# Patient Record
Sex: FEMALE | Race: WHITE | NOT HISPANIC OR LATINO | URBAN - METROPOLITAN AREA
[De-identification: names, ages, dates, MRNs, and addresses within clinical notes are randomized per-mention and may not be internally consistent; named-entity substitution may affect disease eponyms.]

---

## 2021-09-01 ENCOUNTER — OFFICE VISIT (OUTPATIENT)
Dept: URBAN - METROPOLITAN AREA CLINIC 63 | Facility: CLINIC | Age: 66
End: 2021-09-01

## 2021-10-04 ENCOUNTER — OFFICE VISIT (OUTPATIENT)
Dept: URBAN - METROPOLITAN AREA SURGERY CENTER 4 | Facility: SURGERY CENTER | Age: 66
End: 2021-10-04

## 2021-10-18 ENCOUNTER — OFFICE VISIT (OUTPATIENT)
Dept: URBAN - METROPOLITAN AREA CLINIC 63 | Facility: CLINIC | Age: 66
End: 2021-10-18

## 2021-11-08 ENCOUNTER — OFFICE VISIT (OUTPATIENT)
Dept: URBAN - METROPOLITAN AREA SURGERY CENTER 4 | Facility: SURGERY CENTER | Age: 66
End: 2021-11-08

## 2021-11-10 LAB — PATHOLOGY (INDENTED REPORT): (no result)

## 2021-11-22 ENCOUNTER — OFFICE VISIT (OUTPATIENT)
Dept: URBAN - METROPOLITAN AREA CLINIC 63 | Facility: CLINIC | Age: 66
End: 2021-11-22

## 2022-07-09 ENCOUNTER — TELEPHONE ENCOUNTER (OUTPATIENT)
Dept: URBAN - METROPOLITAN AREA CLINIC 121 | Facility: CLINIC | Age: 67
End: 2022-07-09

## 2022-07-09 RX ORDER — BLACK COHOSH ROOT EXTRACT 80 MG
CAPSULE ORAL ONCE A DAY
Refills: 0 | OUTPATIENT
Start: 2016-06-14 | End: 2017-06-15

## 2022-07-09 RX ORDER — THYROID, PORCINE 30 MG/1
TABLET ORAL TAKE AS DIRECTED
Refills: 0 | OUTPATIENT
Start: 2012-02-23 | End: 2016-06-14

## 2022-07-09 RX ORDER — LEVOTHYROXINE SODIUM 112 MCG
110 MCG TABLET ORAL ONCE A DAY
Refills: 0 | OUTPATIENT
Start: 2021-09-01 | End: 2021-11-22

## 2022-07-09 RX ORDER — CITALOPRAM 20 MG/1
TABLET ORAL TAKE AS DIRECTED
Refills: 0 | OUTPATIENT
Start: 2012-02-23 | End: 2016-06-14

## 2022-07-09 RX ORDER — CYANOCOBALAMIN
KIT
Refills: 0 | OUTPATIENT
Start: 2021-09-01 | End: 2021-11-22

## 2022-07-09 RX ORDER — BUSPIRONE HYDROCHLORIDE 10 MG/1
TABLET ORAL TWICE A DAY
Refills: 0 | OUTPATIENT
Start: 2017-06-15 | End: 2017-08-10

## 2022-07-09 RX ORDER — ONDANSETRON 4 MG/1
USE AS DIRECTED TAKE ONE TABLET 30 MINUTES PRIOR TO EACH PART OF COLONOSCOPY PREP TABLET, ORALLY DISINTEGRATING ORAL
Refills: 0 | OUTPATIENT
Start: 2021-09-01 | End: 2021-11-22

## 2022-07-09 RX ORDER — ALPRAZOLAM 0.25 MG/1
TABLET ORAL AS NEEDED
Refills: 0 | OUTPATIENT
Start: 2017-08-10 | End: 2021-09-01

## 2022-07-09 RX ORDER — METOPROLOL SUCCINATE 25 MG/1
TABLET, EXTENDED RELEASE ORAL
Refills: 0 | OUTPATIENT
Start: 2021-09-01 | End: 2021-11-22

## 2022-07-09 RX ORDER — ESOMEPRAZOLE MAGNESIUM 40 MG/1
TAKE 1 CAPSULE ONCE DAILY CAPSULE, DELAYED RELEASE ORAL
Refills: 1 | OUTPATIENT
Start: 2018-02-02 | End: 2018-07-30

## 2022-07-09 RX ORDER — HYDROCHLOROTHIAZIDE 12.5 MG/1
CAPSULE ORAL ONCE A DAY
Refills: 0 | OUTPATIENT
Start: 2017-06-15 | End: 2017-08-10

## 2022-07-09 RX ORDER — NYSTATIN 100000 [USP'U]/G
POWDER TOPICAL AS NEEDED
Refills: 0 | OUTPATIENT
Start: 2017-08-10 | End: 2021-09-01

## 2022-07-09 RX ORDER — ALPRAZOLAM 0.25 MG/1
TABLET ORAL AS NEEDED
Refills: 0 | OUTPATIENT
Start: 2016-06-14 | End: 2017-06-15

## 2022-07-09 RX ORDER — BUSPIRONE HYDROCHLORIDE 10 MG/1
TABLET ORAL TWICE A DAY
Refills: 0 | OUTPATIENT
Start: 2017-08-10 | End: 2021-09-01

## 2022-07-09 RX ORDER — FAMOTIDINE 40 MG/1
ONCE A DAY AT NIGHT TABLET, FILM COATED ORAL ONCE A DAY
Refills: 1 | OUTPATIENT
Start: 2021-09-01 | End: 2021-11-22

## 2022-07-09 RX ORDER — THYROID, PORCINE 60 MG/1
TABLET ORAL ONCE A DAY
Refills: 0 | OUTPATIENT
Start: 2017-06-15 | End: 2017-08-10

## 2022-07-09 RX ORDER — DICYCLOMINE HYDROCHLORIDE 20 MG/1
TABLET ORAL
Refills: 0 | OUTPATIENT
Start: 2021-09-01 | End: 2021-11-22

## 2022-07-09 RX ORDER — TIZANIDINE 4 MG/1
TABLET ORAL AS NEEDED
Refills: 0 | OUTPATIENT
Start: 2021-09-01 | End: 2021-11-22

## 2022-07-09 RX ORDER — DICYCLOMINE HYDROCHLORIDE 20 MG/1
TABLET ORAL
Refills: 0 | OUTPATIENT
Start: 2016-06-14 | End: 2017-06-15

## 2022-07-09 RX ORDER — EUCALYPTUS OIL/MENTHOL/CAMPHOR 1.2%-4.8%
OINTMENT (GRAM) TOPICAL ONCE A DAY
Refills: 0 | OUTPATIENT
Start: 2016-06-14 | End: 2017-06-15

## 2022-07-09 RX ORDER — VENLAFAXINE HYDROCHLORIDE 150 MG/1
TABLET, EXTENDED RELEASE ORAL ONCE A DAY
Refills: 0 | OUTPATIENT
Start: 2021-09-01 | End: 2021-11-22

## 2022-07-09 RX ORDER — THYROID, PORCINE 60 MG/1
TABLET ORAL ONCE A DAY
Refills: 0 | OUTPATIENT
Start: 2017-08-10 | End: 2021-09-01

## 2022-07-09 RX ORDER — MAGNESIUM 30 MG
TABLET ORAL ONCE A DAY
Refills: 0 | OUTPATIENT
Start: 2021-09-01 | End: 2021-11-22

## 2022-07-09 RX ORDER — NYSTATIN 100000 [USP'U]/G
POWDER TOPICAL AS NEEDED
Refills: 0 | OUTPATIENT
Start: 2017-06-15 | End: 2017-08-10

## 2022-07-09 RX ORDER — DICYCLOMINE HYDROCHLORIDE 20 MG/1
TABLET ORAL AS NEEDED
Refills: 0 | OUTPATIENT
Start: 2017-08-10 | End: 2021-09-01

## 2022-07-09 RX ORDER — PANTOPRAZOLE SODIUM 40 MG/1
TABLET, DELAYED RELEASE ORAL TWICE A DAY
Refills: 0 | OUTPATIENT
Start: 2016-06-14 | End: 2017-06-15

## 2022-07-09 RX ORDER — BUSPIRONE HYDROCHLORIDE 10 MG/1
TABLET ORAL TAKE AS DIRECTED
Refills: 0 | OUTPATIENT
Start: 2012-02-23 | End: 2016-06-14

## 2022-07-09 RX ORDER — CETIRIZINE HYDROCHLORIDE 10 MG/1
CAPSULE, LIQUID FILLED ORAL
Refills: 0 | OUTPATIENT
Start: 2012-04-12 | End: 2016-06-14

## 2022-07-09 RX ORDER — ASPIRIN 81 MG/1
TABLET, DELAYED RELEASE ORAL ONCE A DAY
Refills: 0 | OUTPATIENT
Start: 2021-09-01 | End: 2021-11-22

## 2022-07-09 RX ORDER — VENLAFAXINE HYDROCHLORIDE 150 MG/1
TABLET, EXTENDED RELEASE ORAL ONCE A DAY
Refills: 0 | OUTPATIENT
Start: 2017-08-10 | End: 2021-09-01

## 2022-07-09 RX ORDER — ALPRAZOLAM 0.25 MG/1
TABLET ORAL AS NEEDED
Refills: 0 | OUTPATIENT
Start: 2017-06-15 | End: 2017-08-10

## 2022-07-09 RX ORDER — LEVOTHYROXINE SODIUM 88 UG/1
TABLET ORAL ONCE A DAY
Refills: 0 | OUTPATIENT
Start: 2016-06-14 | End: 2017-06-15

## 2022-07-09 RX ORDER — TRIAMTERENE AND HYDROCHLOROTHIAZIDE 37.5; 25 MG/1; MG/1
CAPSULE ORAL
Refills: 0 | OUTPATIENT
Start: 2021-09-01 | End: 2021-11-22

## 2022-07-09 RX ORDER — DICYCLOMINE HYDROCHLORIDE 20 MG/1
TABLET ORAL AS NEEDED
Refills: 0 | OUTPATIENT
Start: 2017-06-15 | End: 2017-08-10

## 2022-07-09 RX ORDER — ESOMEPRAZOLE MAGNESIUM 40 MG/1
CAPSULE, DELAYED RELEASE ORAL ONCE A DAY
Refills: 0 | OUTPATIENT
Start: 2021-09-01 | End: 2021-11-22

## 2022-07-09 RX ORDER — VENLAFAXINE HYDROCHLORIDE 150 MG/1
TABLET, EXTENDED RELEASE ORAL ONCE A DAY
Refills: 0 | OUTPATIENT
Start: 2017-06-15 | End: 2017-08-10

## 2022-07-09 RX ORDER — NYSTATIN 100000 [USP'U]/G
POWDER TOPICAL THREE TIMES A DAY
Refills: 0 | OUTPATIENT
Start: 2021-09-01 | End: 2021-11-22

## 2022-07-09 RX ORDER — MULTIVIT-MIN/FA/LYCOPEN/LUTEIN .4-300-25
TABLET ORAL ONCE A DAY
Refills: 0 | OUTPATIENT
Start: 2016-06-14 | End: 2017-06-15

## 2022-07-09 RX ORDER — HYDROCHLOROTHIAZIDE 12.5 MG/1
CAPSULE ORAL ONCE A DAY
Refills: 0 | OUTPATIENT
Start: 2017-08-10 | End: 2021-09-01

## 2022-07-09 RX ORDER — BUSPIRONE HYDROCHLORIDE 10 MG/1
TABLET ORAL TAKE AS DIRECTED
Refills: 0 | OUTPATIENT
Start: 2016-06-14 | End: 2017-06-15

## 2022-07-09 RX ORDER — ESOMEPRAZOLE MAGNESIUM 40 MG/1
ONCE A DAY CAPSULE, DELAYED RELEASE ORAL ONCE A DAY
Refills: 1 | OUTPATIENT
Start: 2017-08-10 | End: 2017-08-10

## 2022-07-09 RX ORDER — ESOMEPRAZOLE MAGNESIUM 40 MG/1
ONCE A DAY CAPSULE, DELAYED RELEASE ORAL ONCE A DAY
Refills: 1 | OUTPATIENT
Start: 2017-08-11 | End: 2018-02-02

## 2022-07-10 ENCOUNTER — TELEPHONE ENCOUNTER (OUTPATIENT)
Dept: URBAN - METROPOLITAN AREA CLINIC 121 | Facility: CLINIC | Age: 67
End: 2022-07-10

## 2022-07-10 RX ORDER — FAMOTIDINE 40 MG/1
ONCE A DAY AT NIGHT TABLET, FILM COATED ORAL ONCE A DAY
Refills: 3 | Status: ACTIVE | COMMUNITY
Start: 2021-11-22

## 2022-07-10 RX ORDER — LEVOTHYROXINE SODIUM 112 MCG
110 MCG TABLET ORAL ONCE A DAY
Refills: 0 | Status: ACTIVE | COMMUNITY
Start: 2021-11-22

## 2022-07-10 RX ORDER — OMEPRAZOLE 20 MG/1
CAPSULE, DELAYED RELEASE ORAL ONCE A DAY
Refills: 3 | Status: ACTIVE | COMMUNITY
Start: 2012-04-03

## 2022-07-10 RX ORDER — ESOMEPRAZOLE MAGNESIUM 40 MG/1
CAPSULE, DELAYED RELEASE ORAL ONCE A DAY
Refills: 0 | Status: ACTIVE | COMMUNITY
Start: 2021-11-22

## 2022-07-10 RX ORDER — MAGNESIUM 30 MG
TABLET ORAL ONCE A DAY
Refills: 0 | Status: ACTIVE | COMMUNITY
Start: 2021-11-22

## 2022-07-10 RX ORDER — NYSTATIN 100000 [USP'U]/G
POWDER TOPICAL THREE TIMES A DAY
Refills: 0 | Status: ACTIVE | COMMUNITY
Start: 2021-11-22

## 2022-07-10 RX ORDER — ESOMEPRAZOLE MAGNESIUM 40 MG
ONCE A DAY CAPSULE,DELAYED RELEASE (ENTERIC COATED) ORAL ONCE A DAY
Refills: 1 | Status: ACTIVE | COMMUNITY
Start: 2017-07-21

## 2022-07-10 RX ORDER — CYANOCOBALAMIN
KIT
Refills: 0 | Status: ACTIVE | COMMUNITY
Start: 2021-11-22

## 2022-07-10 RX ORDER — ASPIRIN 81 MG/1
TABLET, DELAYED RELEASE ORAL ONCE A DAY
Refills: 0 | Status: ACTIVE | COMMUNITY
Start: 2021-11-22

## 2022-07-10 RX ORDER — ESOMEPRAZOLE MAGNESIUM 40 MG/1
TAKE 1 CAPSULE ONCE DAILY CAPSULE, DELAYED RELEASE ORAL
Refills: 0 | Status: ACTIVE | COMMUNITY
Start: 2018-07-30

## 2022-07-10 RX ORDER — DICYCLOMINE HYDROCHLORIDE 20 MG/1
TABLET ORAL
Refills: 0 | Status: ACTIVE | COMMUNITY
Start: 2021-11-22

## 2022-07-10 RX ORDER — PANTOPRAZOLE SODIUM 40 MG/1
TABLET, DELAYED RELEASE ORAL TWICE A DAY
Refills: 1 | Status: ACTIVE | COMMUNITY
Start: 2016-06-14

## 2022-07-10 RX ORDER — TRIAMTERENE AND HYDROCHLOROTHIAZIDE 37.5; 25 MG/1; MG/1
CAPSULE ORAL
Refills: 0 | Status: ACTIVE | COMMUNITY
Start: 2021-11-22

## 2022-07-10 RX ORDER — TIZANIDINE 4 MG/1
TABLET ORAL AS NEEDED
Refills: 0 | Status: ACTIVE | COMMUNITY
Start: 2021-11-22

## 2022-07-10 RX ORDER — METOPROLOL SUCCINATE 25 MG/1
TABLET, EXTENDED RELEASE ORAL
Refills: 0 | Status: ACTIVE | COMMUNITY
Start: 2021-11-22

## 2022-07-10 RX ORDER — VENLAFAXINE HYDROCHLORIDE 150 MG/1
TABLET, EXTENDED RELEASE ORAL ONCE A DAY
Refills: 0 | Status: ACTIVE | COMMUNITY
Start: 2021-11-22

## 2023-01-11 ENCOUNTER — APPOINTMENT (RX ONLY)
Dept: URBAN - METROPOLITAN AREA CLINIC 117 | Facility: CLINIC | Age: 68
Setting detail: DERMATOLOGY
End: 2023-01-11

## 2023-01-11 DIAGNOSIS — L82.1 OTHER SEBORRHEIC KERATOSIS: ICD-10-CM

## 2023-01-11 DIAGNOSIS — Q828 OTHER SPECIFIED ANOMALIES OF SKIN: ICD-10-CM

## 2023-01-11 DIAGNOSIS — Z71.89 OTHER SPECIFIED COUNSELING: ICD-10-CM

## 2023-01-11 DIAGNOSIS — Q826 OTHER SPECIFIED ANOMALIES OF SKIN: ICD-10-CM

## 2023-01-11 DIAGNOSIS — D69.2 OTHER NONTHROMBOCYTOPENIC PURPURA: ICD-10-CM

## 2023-01-11 DIAGNOSIS — L57.8 OTHER SKIN CHANGES DUE TO CHRONIC EXPOSURE TO NONIONIZING RADIATION: ICD-10-CM

## 2023-01-11 DIAGNOSIS — Q819 OTHER SPECIFIED ANOMALIES OF SKIN: ICD-10-CM

## 2023-01-11 PROBLEM — L85.8 OTHER SPECIFIED EPIDERMAL THICKENING: Status: ACTIVE | Noted: 2023-01-11

## 2023-01-11 PROCEDURE — ? PRESCRIPTION

## 2023-01-11 PROCEDURE — 99204 OFFICE O/P NEW MOD 45 MIN: CPT

## 2023-01-11 PROCEDURE — ? COUNSELING

## 2023-01-11 PROCEDURE — ? SUNSCREEN RECOMMENDATIONS

## 2023-01-11 RX ORDER — AMMONIUM LACTATE 12 G/100G
LOTION TOPICAL QD
Qty: 225 | Refills: 1 | Status: ERX

## 2023-01-11 RX ORDER — AMMONIUM LACTATE 12 G/100G
LOTION TOPICAL QD
Qty: 225 | Refills: 1 | Status: ERX | COMMUNITY
Start: 2023-01-11

## 2023-01-11 RX ADMIN — AMMONIUM LACTATE: 12 LOTION TOPICAL at 00:00

## 2023-01-11 ASSESSMENT — LOCATION DETAILED DESCRIPTION DERM
LOCATION DETAILED: UPPER STERNUM
LOCATION DETAILED: LEFT ANTERIOR SHOULDER
LOCATION DETAILED: RIGHT PROXIMAL DORSAL FOREARM
LOCATION DETAILED: RIGHT INFERIOR UPPER BACK
LOCATION DETAILED: RIGHT ANTERIOR LATERAL PROXIMAL UPPER ARM
LOCATION DETAILED: LEFT PROXIMAL DORSAL FOREARM

## 2023-01-11 ASSESSMENT — LOCATION SIMPLE DESCRIPTION DERM
LOCATION SIMPLE: RIGHT UPPER ARM
LOCATION SIMPLE: LEFT FOREARM
LOCATION SIMPLE: RIGHT UPPER BACK
LOCATION SIMPLE: CHEST
LOCATION SIMPLE: RIGHT FOREARM
LOCATION SIMPLE: LEFT SHOULDER

## 2023-01-11 ASSESSMENT — LOCATION ZONE DERM
LOCATION ZONE: ARM
LOCATION ZONE: TRUNK

## 2023-10-30 ENCOUNTER — TELEPHONE ENCOUNTER (OUTPATIENT)
Dept: URBAN - METROPOLITAN AREA CLINIC 63 | Facility: CLINIC | Age: 68
End: 2023-10-30

## 2023-10-30 ENCOUNTER — TELEPHONE ENCOUNTER (OUTPATIENT)
Dept: URBAN - METROPOLITAN AREA CLINIC 64 | Facility: CLINIC | Age: 68
End: 2023-10-30

## 2023-10-31 ENCOUNTER — OFFICE VISIT (OUTPATIENT)
Dept: URBAN - METROPOLITAN AREA CLINIC 63 | Facility: CLINIC | Age: 68
End: 2023-10-31
Payer: MEDICARE

## 2023-10-31 ENCOUNTER — LAB OUTSIDE AN ENCOUNTER (OUTPATIENT)
Dept: URBAN - METROPOLITAN AREA CLINIC 63 | Facility: CLINIC | Age: 68
End: 2023-10-31

## 2023-10-31 VITALS
TEMPERATURE: 97.6 F | WEIGHT: 176 LBS | SYSTOLIC BLOOD PRESSURE: 136 MMHG | OXYGEN SATURATION: 94 % | DIASTOLIC BLOOD PRESSURE: 88 MMHG | HEART RATE: 91 BPM | BODY MASS INDEX: 28.28 KG/M2 | HEIGHT: 66 IN

## 2023-10-31 DIAGNOSIS — R10.32 LEFT LOWER QUADRANT PAIN: ICD-10-CM

## 2023-10-31 DIAGNOSIS — R19.7 DIARRHEA, UNSPECIFIED TYPE: ICD-10-CM

## 2023-10-31 DIAGNOSIS — R10.9 ABDOMINAL CRAMPING: ICD-10-CM

## 2023-10-31 DIAGNOSIS — R12 HEARTBURN: ICD-10-CM

## 2023-10-31 PROCEDURE — 99214 OFFICE O/P EST MOD 30 MIN: CPT | Performed by: NURSE PRACTITIONER

## 2023-10-31 RX ORDER — METOPROLOL SUCCINATE 25 MG/1
TABLET, EXTENDED RELEASE ORAL
Qty: 90 TABLET | Status: ACTIVE | COMMUNITY

## 2023-10-31 RX ORDER — NYSTATIN 100000 [USP'U]/G
POWDER TOPICAL THREE TIMES A DAY
Refills: 0 | Status: ACTIVE | COMMUNITY
Start: 2021-11-22

## 2023-10-31 RX ORDER — EPINEPHRINE 0.3 MG/.3ML
INJECTION INTRAMUSCULAR; SUBCUTANEOUS
Qty: 2 EACH | Status: ACTIVE | COMMUNITY

## 2023-10-31 RX ORDER — ASPIRIN 81 MG/1
TABLET, DELAYED RELEASE ORAL ONCE A DAY
Refills: 0 | Status: ACTIVE | COMMUNITY
Start: 2021-11-22

## 2023-10-31 RX ORDER — ALBUTEROL SULFATE 90 UG/1
INHALE 2 INHALATIONS BY MOUTH EVERY 4 HOURS AS NEEDED AEROSOL, METERED RESPIRATORY (INHALATION)
Qty: 18 GRAM | Refills: 0 | Status: ACTIVE | COMMUNITY

## 2023-10-31 RX ORDER — LEVOTHYROXINE SODIUM 100 UG/1
TABLET ORAL
Qty: 90 TABLET | Status: ACTIVE | COMMUNITY

## 2023-10-31 RX ORDER — ESOMEPRAZOLE MAGNESIUM 40 MG/1
CAPSULE, DELAYED RELEASE ORAL
Qty: 90 CAPSULE | Status: ACTIVE | COMMUNITY

## 2023-10-31 RX ORDER — MAGNESIUM 30 MG
TABLET ORAL ONCE A DAY
Refills: 0 | Status: ACTIVE | COMMUNITY
Start: 2021-11-22

## 2023-10-31 RX ORDER — VENLAFAXINE HYDROCHLORIDE 150 MG/1
CAPSULE, EXTENDED RELEASE ORAL
Qty: 90 CAPSULE | Status: ACTIVE | COMMUNITY

## 2023-10-31 RX ORDER — IBUPROFEN 800 MG/1
TABLET ORAL
Qty: 16 TABLET | Status: ACTIVE | COMMUNITY

## 2023-10-31 RX ORDER — CELECOXIB 100 MG/1
CAPSULE ORAL
Qty: 30 CAPSULE | Status: ACTIVE | COMMUNITY

## 2023-10-31 RX ORDER — DICYCLOMINE HYDROCHLORIDE 20 MG/1
1 TABLET TABLET ORAL
Qty: 120 | Refills: 3 | OUTPATIENT
Start: 2023-10-31 | End: 2024-02-27

## 2023-10-31 RX ORDER — TRIAMTERENE AND HYDROCHLOROTHIAZIDE 37.5; 25 MG/1; MG/1
CAPSULE ORAL
Qty: 90 CAPSULE | Status: ACTIVE | COMMUNITY

## 2023-10-31 RX ORDER — FAMOTIDINE 40 MG/1
TABLET, FILM COATED ORAL
Qty: 90 TABLET | Status: ACTIVE | COMMUNITY

## 2023-10-31 NOTE — HPI-TODAY'S VISIT:
Reports 2 months of cramping before bowel movements. Has not had this in a long time. Stools start formed and become mushy. Usually has a bm after meals. 2-3 bm typically but some days more. Also some RUQ pain. Having more heartburn as well. Taking esomeprazole 40mg in the morning a few days per week and famotidine daily. Heartburn generally happens during the day not the evening. Feels more gassy than usual lately. Cramps occuring daily. Appetite not very good since the cramping started. Denies diet changes. No blood in the stool, some mucous. No oily or greasy stools. Generally light brown but not tan or khaki. Gallbladder removed years go but states the pain feels similar. Pain mostly LLQ, very tender in lower quadrants. Has  a history of c diff   Last colonoscopy 11/2021 with 1 10mm adenoma and 2 hyperplastic polyps. 3 yr recall

## 2023-10-31 NOTE — PHYSICAL EXAM GASTROINTESTINAL
Abdomen , soft, moderately tender in lower quadrants, nondistended , no guarding or rigidity , no masses palpable , normal bowel sounds , Unable to palpate Liver and Spleen due to body habitus, liver nontender, No Ascites, No hernia

## 2023-12-19 ENCOUNTER — DASHBOARD ENCOUNTERS (OUTPATIENT)
Age: 68
End: 2023-12-19

## 2023-12-19 ENCOUNTER — OFFICE VISIT (OUTPATIENT)
Dept: URBAN - METROPOLITAN AREA CLINIC 63 | Facility: CLINIC | Age: 68
End: 2023-12-19
Payer: MEDICARE

## 2023-12-19 VITALS
OXYGEN SATURATION: 97 % | TEMPERATURE: 97.2 F | DIASTOLIC BLOOD PRESSURE: 80 MMHG | WEIGHT: 214 LBS | SYSTOLIC BLOOD PRESSURE: 134 MMHG | HEIGHT: 66 IN | BODY MASS INDEX: 34.39 KG/M2 | HEART RATE: 88 BPM

## 2023-12-19 DIAGNOSIS — K58.1 IRRITABLE BOWEL SYNDROME WITH CONSTIPATION: ICD-10-CM

## 2023-12-19 DIAGNOSIS — R12 HEARTBURN: ICD-10-CM

## 2023-12-19 DIAGNOSIS — M54.50 BILATERAL LOW BACK PAIN, UNSPECIFIED CHRONICITY, UNSPECIFIED WHETHER SCIATICA PRESENT: ICD-10-CM

## 2023-12-19 PROBLEM — 440630006: Status: ACTIVE | Noted: 2023-12-19

## 2023-12-19 PROCEDURE — 99214 OFFICE O/P EST MOD 30 MIN: CPT | Performed by: NURSE PRACTITIONER

## 2023-12-19 RX ORDER — ALBUTEROL SULFATE 90 UG/1
INHALE 2 INHALATIONS BY MOUTH EVERY 4 HOURS AS NEEDED AEROSOL, METERED RESPIRATORY (INHALATION)
Qty: 18 GRAM | Refills: 0 | Status: ACTIVE | COMMUNITY

## 2023-12-19 RX ORDER — MAGNESIUM 30 MG
TABLET ORAL ONCE A DAY
Refills: 0 | Status: ACTIVE | COMMUNITY
Start: 2021-11-22

## 2023-12-19 RX ORDER — METOPROLOL SUCCINATE 25 MG/1
TABLET, EXTENDED RELEASE ORAL
Qty: 90 TABLET | Status: ACTIVE | COMMUNITY

## 2023-12-19 RX ORDER — NYSTATIN 100000 [USP'U]/G
POWDER TOPICAL THREE TIMES A DAY
Refills: 0 | Status: ACTIVE | COMMUNITY
Start: 2021-11-22

## 2023-12-19 RX ORDER — ASPIRIN 81 MG/1
TABLET, DELAYED RELEASE ORAL ONCE A DAY
Refills: 0 | Status: ACTIVE | COMMUNITY
Start: 2021-11-22

## 2023-12-19 RX ORDER — IBUPROFEN 800 MG/1
TABLET ORAL
Qty: 16 TABLET | Status: ACTIVE | COMMUNITY

## 2023-12-19 RX ORDER — ESOMEPRAZOLE MAGNESIUM 40 MG/1
CAPSULE, DELAYED RELEASE ORAL
Qty: 90 CAPSULE | Status: ACTIVE | COMMUNITY

## 2023-12-19 RX ORDER — ESOMEPRAZOLE MAGNESIUM 40 MG/1
1 CAPSULE CAPSULE, DELAYED RELEASE ORAL ONCE A DAY
Qty: 90 CAPSULE | Refills: 3 | OUTPATIENT
Start: 2023-12-19

## 2023-12-19 RX ORDER — LEVOTHYROXINE SODIUM 100 UG/1
TABLET ORAL
Qty: 90 TABLET | Status: ACTIVE | COMMUNITY

## 2023-12-19 RX ORDER — EPINEPHRINE 0.3 MG/.3ML
INJECTION INTRAMUSCULAR; SUBCUTANEOUS
Qty: 2 EACH | Status: ACTIVE | COMMUNITY

## 2023-12-19 RX ORDER — DICYCLOMINE HYDROCHLORIDE 20 MG/1
1 TABLET TABLET ORAL
Qty: 120 | Refills: 3 | Status: ACTIVE | COMMUNITY
Start: 2023-10-31 | End: 2024-02-27

## 2023-12-19 RX ORDER — VENLAFAXINE HYDROCHLORIDE 150 MG/1
CAPSULE, EXTENDED RELEASE ORAL
Qty: 90 CAPSULE | Status: ACTIVE | COMMUNITY

## 2023-12-19 RX ORDER — TRIAMTERENE AND HYDROCHLOROTHIAZIDE 37.5; 25 MG/1; MG/1
CAPSULE ORAL
Qty: 90 CAPSULE | Status: ACTIVE | COMMUNITY

## 2023-12-19 RX ORDER — FAMOTIDINE 40 MG/1
TABLET, FILM COATED ORAL
Qty: 90 TABLET | Status: ACTIVE | COMMUNITY

## 2023-12-19 RX ORDER — CELECOXIB 100 MG/1
CAPSULE ORAL
Qty: 30 CAPSULE | Status: ACTIVE | COMMUNITY

## 2023-12-19 NOTE — HPI-TODAY'S VISIT:
Fu of 2 months of cramping before bowel movements. Better with bentyl 20mg PRN but this is making her more constipated. Taking a probiotic but no fiber supplements. Stools are harder and moving her bowels every other day. Generally taking bentyl once a day.  Taking esomeprazole 40mg in the morning a few days per week and famotidine daily. Heartburn generally happens during the day not the evening.  No blood in the stool, some mucous. No oily or greasy stools. Generally light brown but not tan or khaki. Gallbladder removed years go but states the pain feels similar. Pain mostly LLQ, very tender in lower quadrants.   Has  a history of c diff  Last colonoscopy 11/2021 with 1 10mm adenoma and 2 hyperplastic polyps. 3 yr recall

## 2023-12-19 NOTE — PHYSICAL EXAM GASTROINTESTINAL
Abdomen , soft, LLQ tenderness, nondistended , no guarding or rigidity , no masses palpable , normal bowel sounds , Unable to palpate Liver and Spleen due to body habitus, liver nontender, No Ascites, No hernia

## 2024-11-13 ENCOUNTER — OFFICE VISIT (OUTPATIENT)
Dept: URBAN - METROPOLITAN AREA CLINIC 63 | Facility: CLINIC | Age: 69
End: 2024-11-13
Payer: MEDICARE

## 2024-11-13 ENCOUNTER — LAB OUTSIDE AN ENCOUNTER (OUTPATIENT)
Dept: URBAN - METROPOLITAN AREA CLINIC 63 | Facility: CLINIC | Age: 69
End: 2024-11-13

## 2024-11-13 VITALS
TEMPERATURE: 97.6 F | HEART RATE: 88 BPM | DIASTOLIC BLOOD PRESSURE: 62 MMHG | SYSTOLIC BLOOD PRESSURE: 132 MMHG | HEIGHT: 66 IN | WEIGHT: 214 LBS | BODY MASS INDEX: 34.39 KG/M2 | OXYGEN SATURATION: 98 %

## 2024-11-13 DIAGNOSIS — K58.1 IRRITABLE BOWEL SYNDROME WITH CONSTIPATION: ICD-10-CM

## 2024-11-13 DIAGNOSIS — Z12.11 COLON CANCER SCREENING: ICD-10-CM

## 2024-11-13 PROCEDURE — 99214 OFFICE O/P EST MOD 30 MIN: CPT

## 2024-11-13 RX ORDER — LEVOTHYROXINE SODIUM 100 UG/1
TABLET ORAL
Qty: 90 TABLET | Status: ACTIVE | COMMUNITY

## 2024-11-13 RX ORDER — CELECOXIB 100 MG/1
CAPSULE ORAL
Qty: 30 CAPSULE | Status: ACTIVE | COMMUNITY

## 2024-11-13 RX ORDER — NYSTATIN 100000 [USP'U]/G
POWDER TOPICAL THREE TIMES A DAY
Refills: 0 | Status: ACTIVE | COMMUNITY
Start: 2021-11-22

## 2024-11-13 RX ORDER — POLYETHYLENE GLYCOL 3350, SODIUM CHLORIDE, SODIUM BICARBONATE, POTASSIUM CHLORIDE 420; 11.2; 5.72; 1.48 G/4L; G/4L; G/4L; G/4L
AS DIRECTED POWDER, FOR SOLUTION ORAL ONCE
Qty: 4000 | Refills: 0 | OUTPATIENT
Start: 2024-11-13 | End: 2024-11-14

## 2024-11-13 RX ORDER — ONDANSETRON HYDROCHLORIDE 4 MG/1
1 TABLET TABLET, FILM COATED ORAL
Qty: 2 | Refills: 0 | OUTPATIENT
Start: 2024-11-13

## 2024-11-13 RX ORDER — TRIAMTERENE AND HYDROCHLOROTHIAZIDE 37.5; 25 MG/1; MG/1
CAPSULE ORAL
Qty: 90 CAPSULE | Status: ACTIVE | COMMUNITY

## 2024-11-13 RX ORDER — VENLAFAXINE HYDROCHLORIDE 150 MG/1
CAPSULE, EXTENDED RELEASE ORAL
Qty: 90 CAPSULE | Status: ACTIVE | COMMUNITY

## 2024-11-13 RX ORDER — METOPROLOL SUCCINATE 25 MG/1
TABLET, EXTENDED RELEASE ORAL
Qty: 90 TABLET | Status: ACTIVE | COMMUNITY

## 2024-11-13 RX ORDER — FAMOTIDINE 40 MG/1
TABLET, FILM COATED ORAL
Qty: 90 TABLET | Status: ACTIVE | COMMUNITY

## 2024-11-13 RX ORDER — EPINEPHRINE 0.3 MG/.3ML
INJECTION INTRAMUSCULAR; SUBCUTANEOUS
Qty: 2 EACH | Status: ACTIVE | COMMUNITY

## 2024-11-13 RX ORDER — ESOMEPRAZOLE MAGNESIUM 40 MG/1
CAPSULE, DELAYED RELEASE ORAL
Qty: 90 CAPSULE | Status: ACTIVE | COMMUNITY

## 2024-11-13 RX ORDER — ASPIRIN 81 MG/1
TABLET, DELAYED RELEASE ORAL ONCE A DAY
Refills: 0 | Status: ACTIVE | COMMUNITY
Start: 2021-11-22

## 2024-11-13 RX ORDER — ALBUTEROL SULFATE 90 UG/1
INHALE 2 INHALATIONS BY MOUTH EVERY 4 HOURS AS NEEDED AEROSOL, METERED RESPIRATORY (INHALATION)
Qty: 18 GRAM | Refills: 0 | Status: ACTIVE | COMMUNITY

## 2024-11-13 NOTE — HPI-PREVIOUS PROCEDURES
11/8/2021 colonoscopy consistent with nonthrombosed external hemorrhoids and internal hemorrhoids (grade 2) 10 mm polyp in mid ascending colon consistent with sessile serrated polyp 7 mm polyp in rectosigmoid colon consistent with hyperplastic polyp 3 diminutive polyps in proximal rectum consistent with hyperplastic polyps Diverticulosis Stool in entire examined colon Recall for 3 years

## 2024-11-13 NOTE — HPI-ZZZTODAY'S VISIT
Patient is a very pleasant 69-year-old female who presents for DAC with abdominal pain.  She is a patient of Dr. Barreto.  Last seen by JUAN Puri 12/19/2023.  Past medical history significant for GERD, history of C. difficile, IBS mixed, sciatica.  Past surgical history reviewed.  Last colonoscopy 2021 with 10 mm adenomatous polyp 3-year recall.  Family history noncontributory. Patient has longstanding IBS mixed.  She takes Bentyl once daily.  She has GERD for which she takes esomeprazole 40 mg few days per week and famotidine daily.  She gets left lower quadrant abdominal pain associated with her bowel habits.  Patient presents for screening colonoscopy with chief complaint of abdominal pain.  She gets left lower quadrant abdominal pain associated with her IBS constipation.  She uses dicyclomine with good efficacy and does not use fiber supplementation.  She has bowel movements every several days that are hard to pass.  She denies dysphagia, dyspepsia, pyrosis, unintentional weight loss, melena, hematochezia.

## 2024-11-13 NOTE — HPI-PREVIOUS IMAGING
11/7/2023 CT abdomen pelvis with contrast consistent with no acute abnormality Large stool burden suggesting constipation Probable hepatic steatosis

## 2024-11-13 NOTE — HPI-PREVIOUS LABS
5/1/2024 lipid panel significant for total cholesterol 227, triglycerides 277, , non-, CMP significant for low chloride of 97 otherwise normal LFTs, A1c 5.8, CBC significant for RBC of 5.18 but otherwise within normal limits.

## 2025-01-23 ENCOUNTER — OFFICE VISIT (OUTPATIENT)
Dept: URBAN - METROPOLITAN AREA SURGERY CENTER 4 | Facility: SURGERY CENTER | Age: 70
End: 2025-01-23
Payer: MEDICARE

## 2025-01-23 ENCOUNTER — CLAIMS CREATED FROM THE CLAIM WINDOW (OUTPATIENT)
Dept: URBAN - METROPOLITAN AREA CLINIC 4 | Facility: CLINIC | Age: 70
End: 2025-01-23
Payer: MEDICARE

## 2025-01-23 DIAGNOSIS — D12.0 BENIGN NEOPLASM OF CECUM: ICD-10-CM

## 2025-01-23 DIAGNOSIS — Z09 ENCOUNTER FOR FOLLOW-UP EXAMINATION AFTER COMPLETED TREATMENT FOR CONDITIONS OTHER THAN MALIGNANT NEOPLASM: ICD-10-CM

## 2025-01-23 DIAGNOSIS — K64.1 SECOND DEGREE HEMORRHOIDS: ICD-10-CM

## 2025-01-23 DIAGNOSIS — K63.5 POLYP OF ASCENDING COLON, UNSPECIFIED TYPE: ICD-10-CM

## 2025-01-23 DIAGNOSIS — Z86.0100 PERSONAL HISTORY OF COLON POLYPS, UNSPECIFIED: ICD-10-CM

## 2025-01-23 DIAGNOSIS — K57.30 DIVERTICULOSIS OF LARGE INTESTINE WITHOUT PERFORATION OR ABSCESS WITHOUT BLEEDING: ICD-10-CM

## 2025-01-23 PROCEDURE — 45380 COLONOSCOPY AND BIOPSY: CPT | Performed by: INTERNAL MEDICINE

## 2025-01-23 PROCEDURE — 88305 TISSUE EXAM BY PATHOLOGIST: CPT | Performed by: PATHOLOGY

## 2025-01-23 PROCEDURE — 0529F INTRVL 3/>YR PTS CLNSCP DOCD: CPT | Performed by: INTERNAL MEDICINE

## 2025-01-23 PROCEDURE — 00811 ANES LWR INTST NDSC NOS: CPT | Performed by: NURSE ANESTHETIST, CERTIFIED REGISTERED

## 2025-01-23 RX ORDER — ALBUTEROL SULFATE 90 UG/1
INHALE 2 INHALATIONS BY MOUTH EVERY 4 HOURS AS NEEDED AEROSOL, METERED RESPIRATORY (INHALATION)
Qty: 18 GRAM | Refills: 0 | Status: ACTIVE | COMMUNITY

## 2025-01-23 RX ORDER — EPINEPHRINE 0.3 MG/.3ML
INJECTION INTRAMUSCULAR; SUBCUTANEOUS
Qty: 2 EACH | Status: ACTIVE | COMMUNITY

## 2025-01-23 RX ORDER — FAMOTIDINE 40 MG/1
TABLET, FILM COATED ORAL
Qty: 90 TABLET | Status: ACTIVE | COMMUNITY

## 2025-01-23 RX ORDER — VENLAFAXINE HYDROCHLORIDE 150 MG/1
CAPSULE, EXTENDED RELEASE ORAL
Qty: 90 CAPSULE | Status: ACTIVE | COMMUNITY

## 2025-01-23 RX ORDER — LEVOTHYROXINE SODIUM 100 UG/1
TABLET ORAL
Qty: 90 TABLET | Status: ACTIVE | COMMUNITY

## 2025-01-23 RX ORDER — TRIAMTERENE AND HYDROCHLOROTHIAZIDE 37.5; 25 MG/1; MG/1
CAPSULE ORAL
Qty: 90 CAPSULE | Status: ACTIVE | COMMUNITY

## 2025-01-23 RX ORDER — CELECOXIB 100 MG/1
CAPSULE ORAL
Qty: 30 CAPSULE | Status: ACTIVE | COMMUNITY

## 2025-01-23 RX ORDER — METOPROLOL SUCCINATE 25 MG/1
TABLET, EXTENDED RELEASE ORAL
Qty: 90 TABLET | Status: ACTIVE | COMMUNITY

## 2025-01-23 RX ORDER — NYSTATIN 100000 [USP'U]/G
POWDER TOPICAL THREE TIMES A DAY
Refills: 0 | Status: ACTIVE | COMMUNITY
Start: 2021-11-22

## 2025-01-23 RX ORDER — ONDANSETRON HYDROCHLORIDE 4 MG/1
1 TABLET TABLET, FILM COATED ORAL
Qty: 2 | Refills: 0 | Status: ACTIVE | COMMUNITY
Start: 2024-11-13

## 2025-01-23 RX ORDER — ESOMEPRAZOLE MAGNESIUM 40 MG/1
CAPSULE, DELAYED RELEASE ORAL
Qty: 90 CAPSULE | Status: ACTIVE | COMMUNITY

## 2025-01-23 RX ORDER — ASPIRIN 81 MG/1
TABLET, DELAYED RELEASE ORAL ONCE A DAY
Refills: 0 | Status: ACTIVE | COMMUNITY
Start: 2021-11-22

## 2025-02-21 ENCOUNTER — OFFICE VISIT (OUTPATIENT)
Dept: URBAN - METROPOLITAN AREA CLINIC 63 | Facility: CLINIC | Age: 70
End: 2025-02-21